# Patient Record
Sex: FEMALE
[De-identification: names, ages, dates, MRNs, and addresses within clinical notes are randomized per-mention and may not be internally consistent; named-entity substitution may affect disease eponyms.]

---

## 2023-04-02 ENCOUNTER — NURSE TRIAGE (OUTPATIENT)
Dept: OTHER | Facility: CLINIC | Age: 53
End: 2023-04-02

## 2023-04-02 NOTE — TELEPHONE ENCOUNTER
Location of patient: [address] call from [facility] ([facility]) with [facility]. Nestor Kearns MRN: [id number]    Subjective: Caller states \"She thinks she may have a UTI, Painful urination. Started yesterday\"     Current Symptoms: Hematuria, dysuria, urinary urgency and pressure, nausea    Associated Symptoms: NA    Pain Severity: 4/10; burning, pressure; constant    Temperature: Patient denies by unknown method    What has been tried: AZO    Recommended disposition: See PCP within 24 Hours    Care advice provided, patient verbalizes understanding; denies any other questions or concerns. Outcome:  Calling on call provider, Dr. Lori Duvall. Dr Lori Duvall on the line speaking to the patient. Dr. Lori Duvall wants her to collect a sample today and he will call in a prescription. He is putting in an order for the culture.  He will also call in Macrobid BID for 7 days to [facility]       This triage is a result of a call to the [address] [facility]      Reason for Disposition   Blood in urine (red, pink, or tea-colored)    Protocols used: Urination Pain - [facility]